# Patient Record
Sex: FEMALE | Race: BLACK OR AFRICAN AMERICAN | NOT HISPANIC OR LATINO | Employment: OTHER | URBAN - METROPOLITAN AREA
[De-identification: names, ages, dates, MRNs, and addresses within clinical notes are randomized per-mention and may not be internally consistent; named-entity substitution may affect disease eponyms.]

---

## 2019-06-07 PROBLEM — M32.8 OTHER FORMS OF SYSTEMIC LUPUS ERYTHEMATOSUS: Status: ACTIVE | Noted: 2019-06-07

## 2019-06-11 PROBLEM — Z79.01 CHRONIC ANTICOAGULATION: Status: ACTIVE | Noted: 2019-06-11

## 2019-06-11 PROBLEM — I10 ESSENTIAL HYPERTENSION: Status: ACTIVE | Noted: 2019-06-11

## 2019-06-11 PROBLEM — M85.89 OSTEOPENIA OF MULTIPLE SITES: Status: ACTIVE | Noted: 2019-06-11

## 2019-06-11 PROBLEM — R05.3 PERSISTENT DRY COUGH: Chronic | Status: ACTIVE | Noted: 2019-06-11

## 2019-06-11 PROBLEM — R05.3 PERSISTENT DRY COUGH: Status: ACTIVE | Noted: 2019-06-11

## 2019-07-24 PROBLEM — Z79.52 LONG TERM (CURRENT) USE OF SYSTEMIC STEROIDS: Status: ACTIVE | Noted: 2019-07-24

## 2019-07-26 PROBLEM — K21.9 GASTROESOPHAGEAL REFLUX DISEASE WITHOUT ESOPHAGITIS: Status: ACTIVE | Noted: 2019-07-06

## 2019-07-26 PROBLEM — D61.818: Status: ACTIVE | Noted: 2019-07-26

## 2019-07-26 PROBLEM — I63.30 CEREBRAL INFARCTION DUE TO THROMBOSIS OF CEREBRAL ARTERY: Status: ACTIVE | Noted: 2019-07-26

## 2019-07-26 PROBLEM — I67.9 CVD (CEREBROVASCULAR DISEASE): Status: ACTIVE | Noted: 2019-07-06

## 2019-07-26 PROBLEM — I10 ESSENTIAL (PRIMARY) HYPERTENSION: Status: ACTIVE | Noted: 2019-07-26

## 2019-07-26 PROBLEM — G05.3 LUPUS CEREBRITIS: Status: ACTIVE | Noted: 2019-07-26

## 2019-07-26 PROBLEM — D64.9 ANEMIA: Status: ACTIVE | Noted: 2019-07-06

## 2019-07-26 PROBLEM — M32.19 LUPUS CEREBRITIS: Status: ACTIVE | Noted: 2019-07-26

## 2019-07-29 PROBLEM — B97.7 HIGH RISK HPV INFECTION: Status: ACTIVE | Noted: 2019-07-29

## 2019-07-29 PROBLEM — N90.89 VULVAL LESION: Status: ACTIVE | Noted: 2019-07-29

## 2019-08-22 PROBLEM — I49.9 CARDIAC ARRHYTHMIA: Status: ACTIVE | Noted: 2019-08-22

## 2019-08-22 PROBLEM — S06.5XAA SUBDURAL HEMATOMA: Status: ACTIVE | Noted: 2018-04-08

## 2019-08-22 PROBLEM — Z86.718 HISTORY OF DEEP VENOUS THROMBOSIS: Status: ACTIVE | Noted: 2019-08-22

## 2019-08-22 PROBLEM — R26.81 GAIT INSTABILITY: Status: ACTIVE | Noted: 2019-03-14

## 2019-08-22 PROBLEM — Z86.19 HISTORY OF HERPES ZOSTER: Status: ACTIVE | Noted: 2017-01-17

## 2019-08-22 PROBLEM — I21.4 NSTEMI (NON-ST ELEVATED MYOCARDIAL INFARCTION): Status: ACTIVE | Noted: 2019-08-22

## 2019-08-22 PROBLEM — R01.1 HEART MURMUR: Status: ACTIVE | Noted: 2019-08-22

## 2019-08-22 PROBLEM — Z87.898 HISTORY OF ANGIOEDEMA: Status: ACTIVE | Noted: 2017-01-17

## 2019-11-04 PROBLEM — N87.0 CIN I (CERVICAL INTRAEPITHELIAL NEOPLASIA I): Status: ACTIVE | Noted: 2019-11-04

## 2019-11-04 PROBLEM — D07.1 VIN III (VULVAR INTRAEPITHELIAL NEOPLASIA III): Status: ACTIVE | Noted: 2019-11-04

## 2019-11-18 PROBLEM — D59.10 AIHA (AUTOIMMUNE HEMOLYTIC ANEMIA): Status: ACTIVE | Noted: 2019-11-18

## 2019-11-18 PROBLEM — T78.3XXS: Status: ACTIVE | Noted: 2019-11-18

## 2019-11-25 PROBLEM — B02.9 HERPES ZOSTER WITHOUT COMPLICATION: Status: ACTIVE | Noted: 2019-11-25

## 2020-01-22 PROBLEM — Z30.09 UNWANTED FERTILITY: Status: ACTIVE | Noted: 2020-01-22

## 2020-06-12 PROBLEM — N89.3 VAGINAL DYSPLASIA: Status: ACTIVE | Noted: 2020-06-12

## 2020-06-12 PROBLEM — Z09 S/P EXCISION OF SKIN LESION, FOLLOW-UP EXAM: Status: ACTIVE | Noted: 2020-06-12

## 2020-09-28 PROBLEM — Z09 S/P EXCISION OF SKIN LESION, FOLLOW-UP EXAM: Status: RESOLVED | Noted: 2020-06-12 | Resolved: 2020-09-28

## 2020-10-20 PROBLEM — D69.3 CHRONIC ITP (IDIOPATHIC THROMBOCYTOPENIA): Status: ACTIVE | Noted: 2020-10-20

## 2021-04-09 PROBLEM — A41.9 SEPSIS DUE TO PNEUMONIA: Status: ACTIVE | Noted: 2021-04-09

## 2021-04-09 PROBLEM — J18.9 SEPSIS DUE TO PNEUMONIA: Status: ACTIVE | Noted: 2021-04-09

## 2021-04-09 PROBLEM — R09.89 PULMONARY VASCULAR CONGESTION: Status: ACTIVE | Noted: 2021-04-09

## 2021-07-22 PROBLEM — R87.810 PAP SMEAR OF CERVIX SHOWS HIGH RISK HPV PRESENT: Status: ACTIVE | Noted: 2021-07-22

## 2021-10-10 PROBLEM — G81.91 RIGHT HEMIPARESIS: Status: ACTIVE | Noted: 2021-10-10

## 2021-12-04 PROBLEM — D56.2: Status: ACTIVE | Noted: 2021-12-04

## 2022-01-29 DIAGNOSIS — D84.9 IMMUNOSUPPRESSED STATUS: ICD-10-CM

## 2022-02-04 DIAGNOSIS — D84.9 IMMUNOSUPPRESSED STATUS: ICD-10-CM

## 2022-02-06 DIAGNOSIS — D84.9 IMMUNOSUPPRESSED STATUS: ICD-10-CM

## 2022-06-27 PROBLEM — Z79.899 HIGH RISK MEDICATIONS (NOT ANTICOAGULANTS) LONG-TERM USE: Status: ACTIVE | Noted: 2022-06-27

## 2024-01-19 PROBLEM — N18.32 STAGE 3B CHRONIC KIDNEY DISEASE (CKD): Status: ACTIVE | Noted: 2024-01-19

## 2024-09-20 PROBLEM — D56.8: Status: ACTIVE | Noted: 2019-07-06

## 2024-11-20 PROBLEM — N32.81 OVERACTIVE BLADDER: Status: ACTIVE | Noted: 2024-11-20

## 2025-01-07 PROBLEM — E55.9 VITAMIN D INSUFFICIENCY: Status: ACTIVE | Noted: 2025-01-07

## 2025-01-07 PROBLEM — D50.8 IRON DEFICIENCY ANEMIA SECONDARY TO INADEQUATE DIETARY IRON INTAKE: Status: ACTIVE | Noted: 2025-01-07

## 2025-01-07 PROBLEM — E87.5 HYPERKALEMIA: Status: ACTIVE | Noted: 2025-01-07

## 2025-01-07 PROBLEM — N25.81 SECONDARY HYPERPARATHYROIDISM OF RENAL ORIGIN: Status: ACTIVE | Noted: 2025-01-07

## 2025-02-24 PROBLEM — M87.88 OTHER OSTEONECROSIS, OTHER SITE: Status: ACTIVE | Noted: 2025-02-24

## 2025-02-24 PROBLEM — I69.359 HISTORY OF HEMORRHAGIC STROKE WITH RESIDUAL HEMIPARESIS: Status: ACTIVE | Noted: 2025-02-24

## 2025-02-24 PROBLEM — R56.9 CONVULSIONS, UNSPECIFIED CONVULSION TYPE: Status: RESOLVED | Noted: 2025-02-24 | Resolved: 2025-02-24

## 2025-02-24 PROBLEM — J42 CHRONIC BRONCHITIS, UNSPECIFIED CHRONIC BRONCHITIS TYPE: Status: RESOLVED | Noted: 2025-02-24 | Resolved: 2025-02-24

## 2025-02-24 PROBLEM — J42 CHRONIC BRONCHITIS, UNSPECIFIED CHRONIC BRONCHITIS TYPE: Status: ACTIVE | Noted: 2025-02-24

## 2025-02-24 PROBLEM — D61.818: Status: RESOLVED | Noted: 2019-07-26 | Resolved: 2025-02-24

## 2025-02-24 PROBLEM — D59.19 OTHER AUTOIMMUNE HEMOLYTIC ANEMIA: Status: ACTIVE | Noted: 2025-02-24

## 2025-02-24 PROBLEM — R56.9 CONVULSIONS, UNSPECIFIED CONVULSION TYPE: Status: ACTIVE | Noted: 2025-02-24

## 2025-02-24 PROBLEM — D59.19 OTHER AUTOIMMUNE HEMOLYTIC ANEMIA: Status: RESOLVED | Noted: 2025-02-24 | Resolved: 2025-02-24

## 2025-02-24 PROBLEM — I82.5Z2 CHRONIC DEEP VEIN THROMBOSIS (DVT) OF DISTAL VEIN OF LEFT LOWER EXTREMITY: Status: RESOLVED | Noted: 2025-02-24 | Resolved: 2025-02-24

## 2025-02-24 PROBLEM — I82.5Z2 CHRONIC DEEP VEIN THROMBOSIS (DVT) OF DISTAL VEIN OF LEFT LOWER EXTREMITY: Status: ACTIVE | Noted: 2025-02-24

## 2025-02-24 PROBLEM — M05.741 RHEUMATOID ARTHRITIS INVOLVING RIGHT HAND WITH POSITIVE RHEUMATOID FACTOR: Status: ACTIVE | Noted: 2025-02-24

## 2025-03-10 PROBLEM — E78.5 HYPERLIPIDEMIA: Status: ACTIVE | Noted: 2025-03-10

## 2025-03-10 PROBLEM — G47.00 INSOMNIA: Status: ACTIVE | Noted: 2025-03-10

## 2025-03-10 PROBLEM — R26.9 ABNORMAL GAIT: Status: ACTIVE | Noted: 2019-03-14

## 2025-04-03 ENCOUNTER — OUTPATIENT CASE MANAGEMENT (OUTPATIENT)
Dept: ADMINISTRATIVE | Facility: OTHER | Age: 45
End: 2025-04-03
Payer: MEDICARE

## 2025-04-03 NOTE — PROGRESS NOTES
Outpatient Care Management   - Patient Assessment    Patient: Tammie Craft  MRN:  [id number]  Date of Service:  4/3/2025  Completed by:  Sharon Rodriguez LMSW  Referral Date:     Reason for Visit   Patient presents with    OPCM Enrollment Call    Social Work Assessment       Brief Summary:  received a referral from self-referral for the following Low/Mod SW psychosocial needs assisted living for individuals with disabilities and HH for Physical Therapy. Care plan was created in collaboration with patient input.   completed the SDOH questionnaire.       Pt ([phone]) contacted SW and stated that she was needing assistance and was referred by the  at her doctor appointment. Pt stated that she needed assistance with finding a location for assisted living for individuals with disabilities and HH for PT. Pt provided 2 identifiers  and address. Pt currently resides at her mother house. Pt stated that she had moved into her mother house to build a better relationship with her mother. Pt provided permission for SW to speak with her mother-Rosario and her Father- Martin for the remainder OPCM enrollment.Pt verified both phone # on her chart were correct for both mom and dad. Pt states that she receives her Supplemental Security Income ( SSI) for disability, pt states that she received about $ 478 every month. Pt has Medicare and Medicaid insurance. Pt states that she does not has SNAP due to having OTC card through her Medicare that assist to pay for groceries and utilities. Pt states she has a Rolator, walker and a shower chair. Pt states for climbing stair she uses the rail to hold and walk up the stairs. Pt states that she has no issues with transportation, pt uses her medical transportation, [employer] for groceries, or her family will  transport pt.  Pt states she take prescribe medication to assist to fall asleep, if she is unable to or feels stressed  out she prays about it. Pt states that she is a non-[employer]. Pt has no thoughts of harming her self or other. Pt voiced she understood. KRYSTIAN provided contact info [phone].KRYSTIAN will follow up with pt with  assisted living for individuals with disabilities, advance directives and  for PT.     Sharon SCHAFFER, [facility]  [phone]

## 2025-04-10 ENCOUNTER — OUTPATIENT CASE MANAGEMENT (OUTPATIENT)
Dept: ADMINISTRATIVE | Facility: OTHER | Age: 45
End: 2025-04-10
Payer: MEDICARE

## 2025-04-14 ENCOUNTER — OUTPATIENT CASE MANAGEMENT (OUTPATIENT)
Dept: ADMINISTRATIVE | Facility: OTHER | Age: 45
End: 2025-04-14
Payer: MEDICARE

## 2025-04-21 ENCOUNTER — OUTPATIENT CASE MANAGEMENT (OUTPATIENT)
Dept: ADMINISTRATIVE | Facility: OTHER | Age: 45
End: 2025-04-21
Payer: MEDICARE

## 2025-04-21 NOTE — PROGRESS NOTES
Outpatient Care Management   - Care Plan Follow Up    Patient: Tammie Craft  MRN:  [id number]  Date of Service:  4/21/2025  Completed by:  Sharon Rodriguez LMSW  Referral Date:     Reason for Visit   Patient presents with    OPCM SW Follow Up Call       Brief Summary: KRYSTIAN contacted pt at [phone]. KRYSTIAN inform pt that PCP Dr. Baron was asked to provided a excuse letter for Jury Duty. Pt stated that she had jury duty on the April 7 and called them on April 6 & 7 and they stated that they did not need her. Pt stated that [facility] had reached out to her on Friday and had set up a visit for PT this week on Tuesday. Pt was grateful about receiving HH. SW informed pt that she will be reaching out to Dr. Baron about the jury duty letter and reaching out to Rashida at [facility] to update her. Pt stated that she will be calling Rashida from [facility] Phone # [phone]  today to continue to the housing process.  Pt  voiced she understood. KRYSTIAN will follow up in one week.     KRYSTIAN reached out via [employer] chat  to  PCP Dr. Baron with this excuse letter template below. KRYSTIAN informed Dr. Baron that her  was hoping to get one for the future due to pt having some medical conditions that might hinder the jury duty process. May you please use this one at the bottom, if you may please print it and sign it, I can collect the real copy as the courthouse will only accept the signed physical copy. Once KRYSTIAN receives the signed copy KRYSTIAN will reached out to Rashida  at [facility]. SW will follow up.     Tammie Craft  [address] [place] [id number]  [phone]    04/21/2025    Subject: Request for Medical Excuse from Jury Duty       Dear Jury Commissioner  ,   I am writing to request an excuse from jury duty for Tammie Craft, my patient. She has a medical condition that prevents her from being able to appear for jury duty at this time.   Specifically, she is suffering from  [briefly describe the condition]. This condition is [temporary/permanent]. This condition and its impact on Tammie Craft's ability to serve.       Thank you for your time and consideration in this matter.     Sincerely,    [Physician's Signature]  [Physician's Printed Name]  [Physician's License Number]     Sharon SCHAFFER, [facility]  [phone]    Complex Care Plan     Care plan was discussed and completed today with input from patient and/or caregiver.    Patient Instructions

## 2025-04-29 ENCOUNTER — OUTPATIENT CASE MANAGEMENT (OUTPATIENT)
Dept: ADMINISTRATIVE | Facility: OTHER | Age: 45
End: 2025-04-29
Payer: MEDICARE

## 2025-05-15 PROBLEM — E87.20 METABOLIC ACIDOSIS: Status: ACTIVE | Noted: 2025-05-15

## 2025-05-15 PROBLEM — E87.5 HYPERKALEMIA: Status: RESOLVED | Noted: 2025-01-07 | Resolved: 2025-05-15

## 2025-05-21 ENCOUNTER — OUTPATIENT CASE MANAGEMENT (OUTPATIENT)
Dept: ADMINISTRATIVE | Facility: OTHER | Age: 45
End: 2025-05-21
Payer: MEDICARE